# Patient Record
Sex: MALE | URBAN - METROPOLITAN AREA
[De-identification: names, ages, dates, MRNs, and addresses within clinical notes are randomized per-mention and may not be internally consistent; named-entity substitution may affect disease eponyms.]

---

## 2020-08-25 ENCOUNTER — OFFICE VISIT CONVERTED (OUTPATIENT)
Dept: NEUROSURGERY | Facility: CLINIC | Age: 55
End: 2020-08-25
Attending: PHYSICIAN ASSISTANT

## 2021-04-15 ENCOUNTER — CONVERSION ENCOUNTER (OUTPATIENT)
Dept: NEUROLOGY | Facility: CLINIC | Age: 56
End: 2021-04-15

## 2021-04-15 ENCOUNTER — OFFICE VISIT CONVERTED (OUTPATIENT)
Dept: NEUROSURGERY | Facility: CLINIC | Age: 56
End: 2021-04-15
Attending: PHYSICIAN ASSISTANT

## 2021-05-10 NOTE — H&P
History and Physical      Patient Name: RHEA PRICE   Patient ID: [id number]   Sex: Male   YOB: 1965        Visit Date: August 25, 2020    Provider: Sue Rosen PA-C   Location: [facility]   Location Address: [address] [place] [address]  [id number]   Location Phone: [phone]          Chief Complaint  · Back and right leg pain      History Of Present Illness  The patient is a 55 year old male, who presents on referral from Yunior Briggs MD, for a neurosurgical evaluation of low back pain and right leg pain.   The right leg pain involves the right foot in a nonspecific distribution. The pain developed gradually 3 months ago It is moderate (3-6/10) in severity, has an aching and a sharp quality and radiates into the right foot in a nonspecific distribution. The pain has been constant. The pain tends to be maximal at night and the pain interferes with sleep. The patient states the pain is aggravated by prolonged standing, walking long distances, and staying in one position for extended periods. Improves some with sitting and lying down.   He denies bowel or bladder dysfunction. The patient has no prior history of neck or back surgery.   RECENT INTERVENTIONS:  He has been previously treated with physical therapy, NSAIDs, and Tylenol. The physical therapy was ineffective in relieving the pain.   INFORMATION REVIEWED:  The following information was reviewed: radiology reports and images. MRI of the lumbar spine and at [facility] revealed small left L3/4 foraminal disc protrusion and small left L4/5 disc protrusion. These were the most notable findings.       Medication List  Garcinia Cambogia 200-500 mcg-mg oral tablet; metformin 1,000 mg oral tablet         Allergy List  NO KNOWN DRUG ALLERGIES       Allergies Reconciled  Social History  Tobacco (Never)         Review of Systems  · Constitutional  o Admits  o : weight gain  o Denies  o : chills, excessive  "sweating, fatigue, fever, sycope/passing out, weight loss  · Eyes  o Admits  o : changes in vision, blurry vision  o Denies  o : double vision  · HENT  o Denies  o : loss of hearing, ringing in the ears, ear aches, sore throat, nasal congestion, sinus pain, nose bleeds, seasonal allergies  · Cardiovascular  o Admits  o : swollen legs  o Denies  o : blood clots, anemia, easy burising or bleeding, transfusions  · Respiratory  o Denies  o : shortness of breath, dry cough, productive cough, pneumonia, COPD  · Gastrointestinal  o Denies  o : difficulty swallowing, reflux  · Genitourinary  o Denies  o : incontinence  · Neurologic  o Admits  o : difficulty with sleep, numbness/tingling/paresthesia   o Denies  o : headache, seizure, stroke, tremor, loss of balance, falls, dizziness/vertigo, difficulty with coordination, difficulty with dexterity, weakness  · Musculoskeletal  o Admits  o : muscle aches, joint pain, pain radiating in leg, low back pain  o Denies  o : neck stiffness/pain, swollen lymph nodes, weakness, spasms, sciatica, pain radiating in arm  · Endocrine  o Admits  o : diabetes  o Denies  o : thyroid disorder  · Psychiatric  o Denies  o : anxiety, depression  · All Others Negative      Vitals  Date Time BP Position Site L\R Cuff Size HR RR TEMP (F) WT  HT  BMI kg/m2 BSA m2 O2 Sat        08/25/2020 01:16 PM        96.7 311lbs 2oz 5'  9\" 45.94 2.62           Physical Examination  · Constitutional  o Appearance  o : well-nourished, well developed, alert, in no acute distress  · Respiratory  o Respiratory Effort  o : breathing unlabored  · Cardiovascular  o Peripheral Vascular System  o :   § Extremities  § : no edema or cyanosis  · Musculoskeletal  o Spine  o :   § Inspection/Palpation  § : no spinal tenderness or misalignment  o Right Lower Extremity  o :   § Inspection/Palpation  § : no joint or limb tenderness to palpation, no edema present, no ecchymosis  § Joint Stability  § : joint stability within normal " limits  § Range of Motion  § : range of motion normal, no joint crepitations present, no pain on motion, Eduin's test negative  o Left Lower Extremity  o :   § Inspection/Palpation  § : no joint or limb tenderness to palpation, no edema present, no ecchymosis  § Joint Stability  § : joint stability within normal limits  § Range of Motion  § : range of motion normal, no joint crepitations present, no pain on motion, Eduin's test negative  · Skin and Subcutaneous Tissue  o Extremities  o :   § Right Lower Extremity  § : no lesions or areas of discoloration  § Left Lower Extremity  § : no lesions or areas of discoloration  o Back  o : no lesions or areas of discoloration  · Neurologic  o Mental Status Examination  o :   § Orientation  § : alert and oriented to time, person, place and events  o Motor Examination  o :   § RLE Strength  § : strength normal  § RLE Motor Function  § : tone normal, no atrophy, no abnormal movements noted  § LLE Strength  § : strength normal  § LLE Motor Function  § : tone normal, no atrophy, no abnormal movements noted  o Reflexes  o :   § RLE  § : knee and ankle reflexes 1/4, SLR negative  § LLE  § : knee and ankle reflexes 0/4, SLR negative  o Sensation  o :   § Light Touch  § : sensation intact to light touch in extremities  o Gait and Station  o :   § Gait Screening  § : normal gait, able to stand without difficulty  · Psychiatric  o Mood and Affect  o : mood normal, affect appropriate          Assessment  · Degeneration of lumbar intervertebral disc     722.52/M51.36  · Displacement of lumbar intervertebral disc     722.10/M51.26  · Lumbago/low back pain     724.3/M54.40  · Radiculopathy, lumbosacral     724.4/M54.16    Problems Reconciled  Plan  · Orders  o AMIRA Report ([facility]) - 724.3/M54.40 - 08/25/2020  · Medications  o Neurontin 300 mg oral capsule   SIG: take 1 cap PO qhs x 5 days, then 1 cap BID x 5 days, then 1 cap TID thereafter   DISP: (90) capsules with 2  "refills  Prescribed on 08/25/2020     o Medications have been Reconciled  o Transition of Care or Provider Policy  · Instructions  o Encouraged to follow-up with Primary Care Provider for preventative care.  o The ROS and the PFSH were reviewed at today's visit.  o Call or return to office if symptoms worsen or persist.  o I will send him to pain management for LESB and start a trial of gabapentin. He has two small disc protrusions off to the left but no clear significant nerve compression on the right, therefore surgery is not recommended.   · Associate Tasks  o Task ID [id number] \"''Provider to Nurse: stat referral to CPA for LESB please            Electronically Signed by: Sue Rosen PA-C -Author on August 25, 2020 02:32:12 PM  "

## 2021-05-14 VITALS — WEIGHT: 300.5 LBS | TEMPERATURE: 96.7 F | BODY MASS INDEX: 44.51 KG/M2 | HEIGHT: 69 IN

## 2021-05-14 VITALS — HEIGHT: 69 IN | BODY MASS INDEX: 46.08 KG/M2 | TEMPERATURE: 96.7 F | WEIGHT: 311.12 LBS

## 2021-05-14 NOTE — PROGRESS NOTES
Progress Note      Patient Name: Miller Alarcon   Patient ID: [id number]   Sex: Male   YOB: 1965        Visit Date: April 15, 2021    Provider: Sue Rosen PA-C   Location: [facility]   Location Address: [address] [place] [address]  [id number]   Location Phone: [phone]          Chief Complaint     Follow up with low back and right leg pain.       History Of Present Illness  The patient is a 55 year old male who is in the office for followup appointment.      He had three right L3/4 and right L4/5 TFESI with minimal improvement so he was referred back here from pain management. Pain in the low back and into the right posterior thigh down to the knee primarily.  In the mornings trouble standing up straight.  Changed mattresses without relief.  Has some intermittent parethesias in the left and right inner thighs.  Denies bowel/bladder incontinence.  Last MRI lumbar spine was in August 2020 and showed facet arthropathy at several levels with left greater than right foraminal stenosis.  Surgery was not advised at that time.       Past Medical History  Back pain; Diabetes; High blood pressure; High cholesterol; Muscle cramps         Past Surgical History  Hip Surgery; Knee repair         Medication List  Garcinia Cambogia 200-500 mcg-mg oral tablet; metformin 1,000 mg oral tablet; Neurontin 300 mg oral capsule         Allergy List  NO KNOWN DRUG ALLERGIES       Allergies Reconciled  Family Medical History  Arthrtis         Social History  Tobacco (Never)         Review of Systems  · Constitutional  o Denies  o : chills, excessive sweating, fatigue, fever, sycope/passing out, weight gain, weight loss  · Eyes  o Denies  o : changes in vision, blurry vision, double vision  · HENT  o Denies  o : loss of hearing, ringing in the ears, ear aches, sore throat, nasal congestion, sinus pain, nose bleeds, seasonal allergies  · Cardiovascular  o Admits  o : swollen  "legs  o Denies  o : blood clots, anemia, easy burising or bleeding, transfusions  · Respiratory  o Denies  o : shortness of breath, dry cough, productive cough, pneumonia, COPD  · Gastrointestinal  o Denies  o : difficulty swallowing, reflux  · Genitourinary  o Denies  o : incontinence  · Neurologic  o Admits  o : difficulty with sleep, numbness/tingling/paresthesia   o Denies  o : headache, seizure, stroke, tremor, loss of balance, falls, dizziness/vertigo, difficulty with coordination, difficulty with dexterity, weakness  · Musculoskeletal  o Admits  o : joint pain, pain radiating in arm, pain radiating in leg, low back pain  o Denies  o : neck stiffness/pain, swollen lymph nodes, muscle aches, weakness, spasms, sciatica  · Endocrine  o Admits  o : diabetes  o Denies  o : thyroid disorder  · Psychiatric  o Denies  o : anxiety, depression  · All Others Negative      Vitals  Date Time BP Position Site L\R Cuff Size HR RR TEMP (F) WT  HT  BMI kg/m2 BSA m2 O2 Sat FR L/min FiO2        04/15/2021 03:06 PM        96.7 300lbs 8oz 5'  9\" 44.38 2.58             Physical Examination  · Constitutional  o Appearance  o : well-nourished, well developed, alert, in no acute distress  · Respiratory  o Respiratory Effort  o : breathing unlabored  · Cardiovascular  o Peripheral Vascular System  o :   § Extremities  § : no cyanosis, clubbing or edema  · Neurologic  o Mental Status Examination  o :   § Orientation  § : grossly oriented to person, place and time  o Sensation  o :   § Light Touch  § : sensation intact to light touch in extremities  o Gait and Station  o :   § Gait Screening  § : limps favoring the left leg  · Psychiatric  o Mood and Affect  o : mood normal, affect appropriate     TTP in the lower lumbar region, worse on the right  Motor 5/5 in LE  DTR 0/4 left knee and 1/4 right knee  negative SLR bilaterally           Assessment  · Degeneration of lumbar intervertebral disc     722.52/M51.36  · Displacement of lumbar " intervertebral disc     722.10/M51.26  · Lumbago/low back pain     724.3/M54.40  · Radiculopathy, lumbosacral     724.4/M54.16      Plan  · Orders  o AMIRA Report ([facility]) - - 04/15/2021  o MRI lumbar spine wo contrast (53904) - 722.52/M51.36, 722.10/M51.26, 724.3/M54.40, 724.4/M54.16 - 04/15/2021   worsening right leg pain [facility] bring images on CD for surgeon to review please  · Medications  o Medications have been Reconciled  o Transition of Care or Provider Policy  · Instructions  o The ROS and the PFSH were reviewed at today's visit.  o Call or return to office if symptoms worsen or persist.   o He has failed right L3/4 and right L4/5 TFESI. I will update his imaging to see if any worsening foraminal stenosis on the right to see if surgery would be beneficial. F/U after imaging to discuss results.             Electronically Signed by: Sue Rosen PA-C -Author on April 15, 2021 03:19:52 PM

## 2021-05-24 ENCOUNTER — OFFICE VISIT CONVERTED (OUTPATIENT)
Dept: NEUROLOGY | Facility: CLINIC | Age: 56
End: 2021-05-24
Attending: NURSE PRACTITIONER

## 2021-06-06 NOTE — PROGRESS NOTES
Progress Note      Patient Name: Miller Alarcon   Patient ID: [id number]   Sex: Male   YOB: 1965    Referring Provider: ELIJAH MENDOZA    Visit Date: May 24, 2021    Provider: LIVAN Cronin   Location: [facility]   Location Address: [address] [place] [address]  [id number]   Location Phone: [phone]          Chief Complaint     Here to discuss MRI results. Complains of low back pain.       History Of Present Illness     Pt with chronic low back pain, worst on the right with radiating symptoms in the posterior thigh stopping at the knee. Recently had repeated MRI Lumbar Spine, unfortunately report as not been read by radiologist at . We reviewed the MRI imaging, appears to be similar from prior report in August 2020. Facet arthropathy and degenerative changes noted, with varying foraminal narrowing throughout.       Past Medical History  Back pain; Diabetes; High blood pressure; High cholesterol; Muscle cramps         Past Surgical History  Hip Surgery; Knee repair         Medication List  Garcinia Cambogia 200-500 mcg-mg oral tablet; metformin 1,000 mg oral tablet         Allergy List  NO KNOWN DRUG ALLERGIES       Allergies Reconciled  Family Medical History  Arthrtis         Social History  Tobacco (Never)         Review of Systems  · Constitutional  o Denies  o : chills, excessive sweating, fatigue, fever, sycope/passing out, weight gain, weight loss  · Eyes  o Denies  o : changes in vision, blurry vision, double vision  · HENT  o Denies  o : loss of hearing, ringing in the ears, ear aches, sore throat, nasal congestion, sinus pain, nose bleeds, seasonal allergies  · Cardiovascular  o Admits  o : swollen legs  o Denies  o : blood clots, anemia, easy burising or bleeding, transfusions  · Respiratory  o Denies  o : shortness of breath, dry cough, productive cough, pneumonia, COPD  · Gastrointestinal  o Denies  o : difficulty swallowing,  "reflux  · Genitourinary  o Denies  o : incontinence  · Neurologic  o Denies  o : headache, seizure, stroke, tremor, loss of balance, falls, dizziness/vertigo, difficulty with sleep, numbness/tingling/paresthesia , difficulty with coordination, difficulty with dexterity, weakness  · Musculoskeletal  o Admits  o : joint pain, pain radiating in leg, low back pain  o Denies  o : neck stiffness/pain, swollen lymph nodes, muscle aches, weakness, spasms, sciatica, pain radiating in arm  · Endocrine  o Admits  o : diabetes  o Denies  o : thyroid disorder  · Psychiatric  o Denies  o : anxiety, depression  · All Others Negative      Vitals  Date Time BP Position Site L\R Cuff Size HR RR TEMP (F) WT  HT  BMI kg/m2 BSA m2 O2 Sat FR L/min FiO2        05/24/2021 03:10 PM        97.6 303lbs 2oz 5'  9\" 44.76 2.59             Physical Examination  · Constitutional  o Appearance  o : well-nourished, well developed, alert, in no acute distress  · Respiratory  o Respiratory Effort  o : breathing unlabored  · Cardiovascular  o Peripheral Vascular System  o :   § Extremities  § : no cyanosis, clubbing or edema  · Neurologic  o Mental Status Examination  o :   § Orientation  § : grossly oriented to person, place and time  o Sensation  o :   § Light Touch  § : sensation intact to light touch in extremities  o Gait and Station  o :   § Gait Screening  § : limps favoring the left leg  · Psychiatric  o Mood and Affect  o : mood normal, affect appropriate     TTP in the lower lumbar region, worse on the right  Motor 5/5 in LE  DTR 0/4 left knee and 1/4 right knee  negative SLR bilaterally               Assessment  · Degeneration of lumbar intervertebral disc     722.52/M51.36  · Displacement of lumbar intervertebral disc     722.10/M51.26  · Facet arthropathy, lumbar     721.3/M46.96  · Lumbago/low back pain     724.3/M54.40  · Radiculopathy, lumbosacral     724.4/M54.16       Pt with chronic low back pain radiating to right posterior thigh. " Continues Gabapentin 300 mg three times daily. He has significant arthritis changes. We do not have the report for his MRI. I will start diclofenac 75 mg twice daily. We discussed consideration for median nerve blocks vs right SI joint injections as TFESI have been ineffective. Follow up in 6 weeks.       Plan  · Medications  o diclofenac sodium 75 mg oral tablet,delayed release (DR/EC)   SIG: take 1 tablet (75 mg) by oral route 2 times per day   DISP: (60) Tablet with 1 refills  Prescribed on 05/24/2021     o Medications have been Reconciled  o Transition of Care or Provider Policy  · Instructions  o Start Diclofenac 75 mg twice daily, take with food.  o Continue Gabapentin 300 mg three times daily.  o Request MRI report.  o Follow up in 6 weeks.            Electronically Signed by: LIVAN Cronin -Author on Floridalma 3, 2021 08:54:28 AM

## 2021-07-06 ENCOUNTER — TELEPHONE (OUTPATIENT)
Dept: NEUROSURGERY | Facility: CLINIC | Age: 56
End: 2021-07-06

## 2021-07-06 NOTE — TELEPHONE ENCOUNTER
Patient called wanting to know if the results from his MRI could just be reviewed over the phone. His primary care has ordered an ultrasound of his kidneys after reviewing the MRI and he won't be able to make it to his appointment today, but would still like your input

## 2021-07-08 ENCOUNTER — TELEPHONE (OUTPATIENT)
Dept: NEUROSURGERY | Facility: CLINIC | Age: 56
End: 2021-07-08

## 2021-07-13 DIAGNOSIS — M47.817 LUMBOSACRAL SPONDYLOSIS WITHOUT MYELOPATHY: Primary | ICD-10-CM

## 2021-07-13 NOTE — TELEPHONE ENCOUNTER
Notified patient. Faxed order to [facility]. Scheduled for follow up on Thursday 8/19

## 2021-07-13 NOTE — TELEPHONE ENCOUNTER
MRI Lumbar Spine shows multilevel degenerative changes, with a new disc protrusion at L5/S1. He has pars defect, which is a small stress fracture at L5. I would like to proceed with XR Lumbar Spine bending views to evaluate for instability. I have placed the orders can you fax to where he typically goes (I believe [facility]). Schedule patient for follow up on a Tuesday or Thursday to discuss. Advise him to bring his disc. Thanks.

## 2021-07-15 VITALS — BODY MASS INDEX: 44.89 KG/M2 | HEIGHT: 69 IN | WEIGHT: 303.12 LBS | TEMPERATURE: 97.6 F

## 2021-07-27 NOTE — TELEPHONE ENCOUNTER
Caller: Miller Alarcon    Relationship: Self    Best call back number: [phone]    What was the call regarding:   PATIENT CALLED BACK TO STATE THAT [facility] HAS NOT RECEIVED THE ORDER YET.     THE PATIENT STATED THE [facility] WOULD LIKE THE ORDERS FAXED TO THEIR RADIOLOGY DEPARTMENT.

## 2021-08-26 ENCOUNTER — OFFICE VISIT (OUTPATIENT)
Dept: NEUROSURGERY | Facility: CLINIC | Age: 56
End: 2021-08-26

## 2021-08-26 VITALS
SYSTOLIC BLOOD PRESSURE: 119 MMHG | HEIGHT: 69 IN | HEART RATE: 66 BPM | DIASTOLIC BLOOD PRESSURE: 64 MMHG | BODY MASS INDEX: 44.88 KG/M2 | WEIGHT: 303 LBS

## 2021-08-26 DIAGNOSIS — M48.061 FORAMINAL STENOSIS OF LUMBAR REGION: ICD-10-CM

## 2021-08-26 DIAGNOSIS — M62.838 MUSCLE SPASM: ICD-10-CM

## 2021-08-26 DIAGNOSIS — M54.16 LUMBAR RADICULOPATHY, RIGHT: ICD-10-CM

## 2021-08-26 DIAGNOSIS — M47.817 LUMBOSACRAL SPONDYLOSIS WITHOUT MYELOPATHY: Primary | ICD-10-CM

## 2021-08-26 PROCEDURE — 99215 OFFICE O/P EST HI 40 MIN: CPT | Performed by: NURSE PRACTITIONER

## 2021-08-26 RX ORDER — BACLOFEN 10 MG/1
10 TABLET ORAL 3 TIMES DAILY PRN
Qty: 90 TABLET | Refills: 2 | Status: SHIPPED | OUTPATIENT
Start: 2021-08-26

## 2021-08-26 RX ORDER — CLONAZEPAM 2 MG/1
TABLET ORAL
COMMUNITY

## 2021-08-26 RX ORDER — DAPAGLIFLOZIN 10 MG/1
1 TABLET, FILM COATED ORAL DAILY
COMMUNITY
Start: 2021-08-02

## 2021-08-26 RX ORDER — DICLOFENAC SODIUM 75 MG/1
75 TABLET, DELAYED RELEASE ORAL 2 TIMES DAILY
Qty: 60 TABLET | Refills: 2 | Status: SHIPPED | OUTPATIENT
Start: 2021-08-26

## 2021-08-26 RX ORDER — DICLOFENAC SODIUM 75 MG/1
75 TABLET, DELAYED RELEASE ORAL 2 TIMES DAILY
COMMUNITY
Start: 2021-06-20 | End: 2021-08-26 | Stop reason: SDUPTHER

## 2021-08-26 RX ORDER — LISINOPRIL 40 MG/1
40 TABLET ORAL DAILY
COMMUNITY
Start: 2021-06-20

## 2021-08-26 RX ORDER — GABAPENTIN 300 MG/1
CAPSULE ORAL
COMMUNITY
End: 2021-08-26

## 2021-08-26 RX ORDER — GLIMEPIRIDE 4 MG/1
4 TABLET ORAL DAILY
COMMUNITY
Start: 2021-08-03

## 2021-08-26 RX ORDER — CYCLOBENZAPRINE HCL 10 MG
TABLET ORAL
COMMUNITY
End: 2021-08-26

## 2021-08-26 NOTE — PATIENT INSTRUCTIONS
-Referral to pain management for lumbar epidural at L5/S1  -Start Baclofen 10 mg as needed muscle spasm  -Continue Diclofenac 75 mg twice daily, take with food  -Follow up in 8 weeks

## 2021-08-26 NOTE — PROGRESS NOTES
"Chief Complaint  Back Pain    Subjective          Miller Alarcon who is a 56 y.o. year old male who presents to [facility] for follow up of his low back pain.    MRI Lumbar Spine showing multilevel degenerative changes with facet arthropathy. Chronic L5 pars defects, with broad posterior extrusion at L5/S1 causing left greater than right foraminal narrowing without significant canal narrowing.     He had bending X-rays, showing no instability with flexion and extension.    Pt's pain primarily in the back, radiating into the hips. He will have muscle tightness and spasms in the back. At his last visit we started diclofenac, which did help his pain. He will have radiating pain into the right leg though this is not as bothersome as his back. He has pain in his right knee, with limited ROM of the knee and pain into the leg when he bends the knee back. He reports arthritis in this knee.      Recent Interventions: TFESI L3/4 and L4/5 on the right were ineffective.      Review of Systems   Musculoskeletal: Positive for arthralgias, back pain, gait problem and joint swelling.   All other systems reviewed and are negative.       Objective   Vital Signs:   /64   Pulse 66   Ht 175.3 cm (69\")   Wt (!) 137 kg (303 lb)   BMI 44.75 kg/m²       Physical Exam  Vitals reviewed.   Constitutional:       Appearance: Normal appearance.   Musculoskeletal:      Lumbar back: Tenderness present. Positive right straight leg raise test. Negative left straight leg raise test.   Neurological:      Mental Status: He is alert and oriented to person, place, and time.        Neurologic Exam     Mental Status   Oriented to person, place, and time.   Level of consciousness: alert    Gait, Coordination, and Reflexes Antalgic gait with limp on the right        Result Review :       Data reviewed: Radiologic studies MRI Lumbar Spine showing multilevel degenerative changes with facet arthropathy. Chronic " L5 pars defects, with broad posterior extrusion at L5/S1 causing left greater than right foraminal narrowing without significant canal narrowing. XR Bending views showed no subluxation of L5 on S1.          Assessment and Plan    Diagnoses and all orders for this visit:    1. Lumbosacral spondylosis without myelopathy (Primary)  -     Ambulatory Referral to Pain Management  -     diclofenac (VOLTAREN) 75 MG EC tablet; Take 1 tablet by mouth 2 (Two) Times a Day.  Dispense: 60 tablet; Refill: 2    2. Muscle spasm  -     baclofen (LIORESAL) 10 MG tablet; Take 1 tablet by mouth 3 (Three) Times a Day As Needed for Muscle Spasms.  Dispense: 90 tablet; Refill: 2    3. Foraminal stenosis of lumbar region  -     Ambulatory Referral to Pain Management    4. Lumbar radiculopathy, right  -     Ambulatory Referral to Pain Management    Pt with severe low back pain with lumbar spondylosis. We discussed at length his pain being primarily attributed to degenerative changes in the spine. He has pain in the right leg as well, though this worsens when bending the knee. We will refer to pain management for LESB. If no improvement I would recommend follow up with Orthopedics for OA in the knee. For his back pain, we will continue his diclofenac. He is having muscle spasms, will start baclofen which he has been instructed to take at bedtime and as needed. We discussed benefits of core strengthening, weight management, light exercise. He will follow up in 8 weeks.     I spent 42 minutes caring for Miller on this date of service. This time includes time spent by me in the following activities:preparing for the visit, reviewing tests, obtaining and/or reviewing a separately obtained history, performing a medically appropriate examination and/or evaluation , counseling and educating the patient/family/caregiver, ordering medications, tests, or procedures, referring and communicating with other health care professionals , documenting  information in the medical record and independently interpreting results and communicating that information with the patient/family/caregiver.    Follow Up   Return in about 8 weeks (around 10/21/2021).  Patient was given instructions and counseling regarding his condition or for health maintenance advice.     -Referral to pain management for lumbar epidural at L5/S1  -Start Baclofen 10 mg as needed muscle spasm  -Continue Diclofenac 75 mg twice daily, take with food  -Follow up in 8 weeks